# Patient Record
Sex: MALE | Race: WHITE | Employment: FULL TIME
[De-identification: names, ages, dates, MRNs, and addresses within clinical notes are randomized per-mention and may not be internally consistent; named-entity substitution may affect disease eponyms.]

---

## 2023-08-16 ENCOUNTER — HOSPITAL ENCOUNTER (OUTPATIENT)
Facility: HOSPITAL | Age: 53
Setting detail: RECURRING SERIES
Discharge: HOME | End: 2023-08-19
Payer: COMMERCIAL

## 2023-08-16 PROCEDURE — 97161 PT EVAL LOW COMPLEX 20 MIN: CPT

## 2023-08-16 NOTE — PROGRESS NOTES
[facility]  [address] Ifeanyi Ramon, [address] [facility]  Ph [phone]  Fx [phone]    Plan of Care/ Statement of Necessity for Physical Therapy Services      Patient name: Faye Low Start of Care: 2023   Referral source: Carly Jessica MD : 1970    Medical Diagnosis: Other low back pain [M54.59]   Onset Date:23    Treatment Diagnosis: M54.59  OTHER LOWER BACK PAIN     Prior Hospitalization: see medical history Provider#: [id number]   Medications: Verified on Patient summary List   Comorbidities: NA  Prior Level of Function: functionally independent, no AD, active lifestyle      The Plan of Care and following information is based on the information from the initial evaluation. Assessment/ key information: 46 yo male who [facility] to [facility] with c/o low back pain. Patient reports chronic low back pain with insidious onset that has gotten progressively worse as he continues to complete work duties and golf. Current symptoms/Complaints: 3-4/10 at best with sitting and resting; 8/10 at worst with laying on back, being on feet. Patient demonstrates decreased ROM, decreased strength, impaired posture, impaired gait mechancis, pain and decreased functional mobility tolerance. Patient will continue to benefit from skilled PT services to modify and progress therapeutic interventions, address functional mobility deficits, address ROM deficits, address strength deficits, analyze and address soft tissue restrictions, analyze and cue movement patterns, analyze and modify body mechanics/ergonomics, and assess and modify postural abnormalities to attain remaining goals.     Evaluation Complexity HistoryLOW Complexity : Zero comorbidities / personal factors that will impact the outcome / POC ; Examination MEDIUM Complexity : 3 Standardized tests and measures addressin body structure, function, activity limitation and / or participation in recreation  ;Presentation MEDIUM

## 2023-08-16 NOTE — PROGRESS NOTES
PT DAILY TREATMENT NOTE/ LUMBAR EVAL 10-18      Patient Name: Radha Jo    Date: 2023    : 1970  Insurance: Payor: Stormy Esparza / Plan: OPTIMA HMO / Product Type: *No Product type* /      Patient  verified yes     Visit #   Current / Total 1 24   Time   In / Out [id number]     TREATMENT AREA =  Other low back pain [M54.59]      SUBJECTIVE  Pain Level (0-10 scale): 4/10  [x]constant []intermittent []improving []worsening []no change since onset    Any medication changes, allergies to medications, adverse drug reactions, diagnosis change, or new procedure performed?: [x] No    [] Yes (see summary sheet for update)  Subjective functional status/changes:     PLOF: functionally independent, no AD, active lifestyle  Limitations to PLOF: pain, weakness, numbness/tingling  Mechanism of Injury: chronic low back pain with insidious onset that has gotten progressively worse as he continues to complete work duties and golf  Current symptoms/Complaints: 3-4/10 at best with sitting and resting; 8/10 at worst with laying on back, being on feet  Previous Treatment/Compliance: has started doing some stretches independently; taking ibuprofen PRN; uses heat  PMHx/Surgical Hx: NA  Work Hx: HVAC contractor  Living Situation: one story home, [address]; reports increased pain with stair navigation  Pt Goals: \"I'm really looking to get some relief so that I'm not in pain all the time\"  Barriers: [x]pain []financial []time []transportation []other  Motivation: good  Substance use: []Alcohol []Tobacco []other:   Cognition: A & O x 3        OBJECTIVE    25 min []Eval - untimed                                 [x]  Patient Education billed concurrently with other procedures   [x] Review HEP    [] Progressed/Changed HEP, detail:    [] Other detail:       General Evaluation    Physical Therapy Evaluation - Lumbar Spine    OBJECTIVE  Posture:  Lateral Shift: [] Right    [x] Left     [x] +  [] -  Kyphosis: [] Increased [] Decreased   [x]

## 2023-09-01 ENCOUNTER — HOSPITAL ENCOUNTER (OUTPATIENT)
Facility: HOSPITAL | Age: 53
Setting detail: RECURRING SERIES
Discharge: HOME | End: 2023-09-04
Payer: COMMERCIAL

## 2023-09-01 PROCEDURE — 97110 THERAPEUTIC EXERCISES: CPT

## 2023-09-01 PROCEDURE — 97112 NEUROMUSCULAR REEDUCATION: CPT

## 2023-09-01 NOTE — PROGRESS NOTES
Negar Dubon, PT [facility]   9/20/2023  7:10 AM Amanuel Kail, PT [facility]   9/22/2023  7:50 AM Jacqueline Rowland Garfield, PT [facility]   9/25/2023  7:50 AM Jacqueline Rowland Garfield, PT [facility]   9/27/2023  7:10 AM Agueda Adsit, PT [facility]   10/2/2023  7:10 AM Agueda Adsit, PT [facility]   10/4/2023  7:10 AM Agueda Adsit, PT [facility]   10/11/2023  7:10 AM Amanuel Kail, PT [facility]   10/13/2023  7:10 AM Amanuel Kail, PT [facility]   10/18/2023  7:10 AM Agueda Adsit, PT [facility]   10/20/2023  7:10 AM Amanuel Kail, PT [facility]   10/25/2023  7:10 AM Agueda Adsit, PT [facility]   10/27/2023  7:10 AM Agueda Adsit, PT [facility]   11/1/2023  7:10 AM Agueda Adsit, PT [facility]   11/3/2023  7:10 AM Agueda Adsit, PT [facility]   11/6/2023  7:10 AM Agueda Adsit, PT [facility]   11/8/2023  7:10 AM Amanuel Kail, PT [facility]   11/13/2023  7:10 AM Agueda Adsit, PT [facility]   11/15/2023  7:10 AM Agueda Adsit, PT [facility]

## 2023-09-06 ENCOUNTER — HOSPITAL ENCOUNTER (OUTPATIENT)
Facility: HOSPITAL | Age: 53
Setting detail: RECURRING SERIES
Discharge: HOME | End: 2023-09-09
Payer: COMMERCIAL

## 2023-09-06 PROCEDURE — 97112 NEUROMUSCULAR REEDUCATION: CPT

## 2023-09-06 PROCEDURE — 97110 THERAPEUTIC EXERCISES: CPT

## 2023-09-06 PROCEDURE — 97535 SELF CARE MNGMENT TRAINING: CPT

## 2023-09-06 PROCEDURE — 97530 THERAPEUTIC ACTIVITIES: CPT

## 2023-09-06 NOTE — PROGRESS NOTES
Status: FOTO 49  FOTO score = an established functional score where 100 = no disability     Patient will improve pain in low back to 5/10 at worst to improve standing and walking tolerance and restore prior level of function. Eval Status: 8/10 at worst     Pt will have painfree lumbar AROM WFL to aid in functional mechanics for ambulation/ADLs. Eval Status:   ROM % AROM Comments:pain, area   Forward flexion 40-60 WFL     Extension 20-30   P! SideBend right 20-30 58cm FFF P! SideBend left 20-30 54cm FFF P! Rotation right 5-10 [facility]     [facility]      9/1: PROGRESSING  ROM % loss Comments:pain, area   Forward flexion 40-60 WFL  stiff   Extension 20-30  90% loss P! SideBend right 20-30 25% loss stiff   SideBend left 20-30 WNL P! Rotation right 5-10 WFL     Rotation left 5-10 WFL      50% loss sideglide hips to left     Long Term Goals: To be accomplished in 24 treatments:  Patient will improve FOTO score by 13 points to improve functional tolerance for return to golf without increased pain. Eval Status: FOTO 49  FOTO score = an established functional score where 100 = no disability     2. Pt will have 5/5 bilateral LE and core strength to return to goals of golfing without increased pain. Eval Status:     Left(0-5) Right (0-5)   Hip Flexion (L1,2) 5 5   Knee Extension (L3,4) 5 5   Ankle Dorsiflexion (L4) 5 5   Hip Extension (L5) 4- 4-   Ankle Plantarflexion (S1) 5 5   Knee Flexion (S1,2) 5 5   Abdominals 4     Paraspinals 4     Back Rotators 4+ 4+   Gluteus Reg 4- 4-   Hip Abduction 4 4         3. Patient will improve pain in low back to 0/10 at worst to improve standing and walking tolerance and restore prior level of function.   Eval Status: 8/10 at worst      PLAN  Yes  Continue plan of care  []  Upgrade activities as tolerated  []  Discharge due to :  []  Other:    Deacon Loja, PT    9/6/2023    7:31 AM    Future Appointments   Date Time Provider [address] [place] [address]   9/7/2023  8:30 AM

## 2023-09-07 ENCOUNTER — APPOINTMENT (OUTPATIENT)
Facility: HOSPITAL | Age: 53
End: 2023-09-07
Payer: COMMERCIAL

## 2023-09-12 ENCOUNTER — HOSPITAL ENCOUNTER (OUTPATIENT)
Facility: HOSPITAL | Age: 53
Setting detail: RECURRING SERIES
Discharge: HOME | End: 2023-09-15
Payer: COMMERCIAL

## 2023-09-12 PROCEDURE — 97530 THERAPEUTIC ACTIVITIES: CPT

## 2023-09-12 PROCEDURE — 97535 SELF CARE MNGMENT TRAINING: CPT

## 2023-09-12 PROCEDURE — 97112 NEUROMUSCULAR REEDUCATION: CPT

## 2023-09-12 PROCEDURE — 97110 THERAPEUTIC EXERCISES: CPT

## 2023-09-12 NOTE — PROGRESS NOTES
AM Dickerson Spencer, PT [facility]   9/20/2023  7:10 AM Dickerson Spencer, PT [facility]   9/22/2023  7:50 AM Eliezer Clayton Garfield, PT [facility]   9/25/2023  7:50 AM Eliezer Murdockell, PT [facility]   9/27/2023  7:10 AM Agueda Adsit, PT [facility]   10/2/2023  7:10 AM Agueda Adsit, PT [facility]   10/4/2023  7:10 AM Agueda Adsit, PT [facility]   10/11/2023  7:10 AM Tuan Diezar, PT [facility]   10/13/2023  7:10 AM Tuan Diezar, PT [facility]   10/18/2023  7:10 AM Agueda Adsit, PT [facility]   10/20/2023  7:10 AM Dickerson Spencer, PT [facility]   10/25/2023  7:10 AM Agueda Adsit, PT [facility]   10/27/2023  7:10 AM Agueda Adsit, PT [facility]   11/1/2023  7:10 AM Agueda Adsit, PT [facility]   11/3/2023  7:10 AM Agueda Adsit, PT [facility]   11/6/2023  7:10 AM Agueda Adsit, PT [facility]   11/8/2023  7:10 AM Dickerson Spencer, PT [facility]   11/13/2023  7:10 AM Agueda Adsit, PT [facility]   11/15/2023  7:10 AM Aguead Adsit, PT [facility]

## 2023-09-15 ENCOUNTER — HOSPITAL ENCOUNTER (OUTPATIENT)
Facility: HOSPITAL | Age: 53
Setting detail: RECURRING SERIES
Discharge: HOME | End: 2023-09-18
Payer: COMMERCIAL

## 2023-09-15 PROCEDURE — 97110 THERAPEUTIC EXERCISES: CPT

## 2023-09-15 PROCEDURE — 97112 NEUROMUSCULAR REEDUCATION: CPT

## 2023-09-15 PROCEDURE — 97530 THERAPEUTIC ACTIVITIES: CPT

## 2023-09-15 NOTE — PROGRESS NOTES
PHYSICAL / OCCUPATIONAL THERAPY - DAILY TREATMENT NOTE (updated )    Patient Name: Brenda Loomis    Date: 9/15/2023    : 1970  Insurance: Payor: Laroy Scheuermann / Plan: OPTIMA HMO / Product Type: *No Product type* /      Patient  verified Yes     Visit #   Current / Total 5 24   Time   In / Out 7:07 751   Pain   In / Out 3 0   Subjective Functional Status/Changes: \"I feel like I make improvement, then I go tot work and it tweaks it. But the stretches are helping, I think I am going in the right direction. \" The right side is still tight. Changes to: Allergies, Med Hx, Sx Hx?   no       TREATMENT AREA =  Other low back pain [M54.59]    OBJECTIVE        Therapeutic Procedures: Tx Min Billable or 1:1 Min (if diff from Tx Min) Procedure, Rationale, Specifics   15 15 95349 Therapeutic Exercise (timed):  increase ROM, strength, coordination, balance, and proprioception to improve patient's ability to progress to PLOF and address remaining functional goals. (see flow sheet as applicable)    Details if applicable:       15 15 90021 Neuromuscular Re-Education (timed):  improve balance, coordination, kinesthetic sense, posture, core stability and proprioception to improve patient's ability to develop conscious control of individual muscles and awareness of position of extremities in order to progress to PLOF and address remaining functional goals. (see flow sheet as applicable)    Details if applicable:      68584 Therapeutic Activity (timed):  use of dynamic activities replicating functional movements to increase ROM, strength, coordination, balance, and proprioception in order to improve patient's ability to progress to PLOF and address remaining functional goals.   (see flow sheet as applicable)     Details if applicable:     44 40 [facility] Totals Reminder: bill using total billable min of TIMED therapeutic procedures (example: do not include dry needle or estim unattended, both untimed codes, in totals to

## 2023-09-15 NOTE — PROGRESS NOTES
[facility]  [address], [address] [facility]  Ph [phone]  Fx [phone]    Physical Therapy Progress Note  Patient name: Igor Fuller Start of Care: 2023   Referral source: Chasidy Lorenzana MD : 1970               Medical Diagnosis: Other low back pain [M54.59]    Onset Date:23               Treatment Diagnosis: M54.59  OTHER LOWER BACK PAIN     Prior Hospitalization: see medical history Provider#: [id number]   Medications: Verified on Patient summary List   Comorbidities: NA  Prior Level of Function: functionally independent, no AD, active lifestyle                                Visits from Start of Care: 5    Missed Visits: 0    Updated Goals/Measure of Progress: To be achieved in 19 treatments:    Short Term Goals: To be accomplished in 6 treatments:  GOAL MET Patient will be independent and compliant with HEP to progress toward goals and restore functional mobility. Eval Status: issued at [facility]  Current: reports he is completing HEP daily since IE 23  PN 9/15: Compliant with HEP. GOAL MET     Patient will improve FOTO score by 6 points to improve functional tolerance for exercise. Eval Status: FOTO 49  PN 9/15: 50 PROGRESSING  FOTO score = an established functional score where 100 = no disability     Patient will improve pain in low back to 5/10 at worst to improve standing and walking tolerance and restore prior level of function. Eval Status: 8/10 at worst  PN 9/15: 7/10 after golf tournament. But pt is able to use exercises to  help manage pain. PROGRESSING     Pt will have painfree lumbar AROM WFL to aid in functional mechanics for ambulation/ADLs. Eval Status:   ROM % AROM Comments:pain, area   Forward flexion 40-60 WFL     Extension 20-30   P! SideBend right 20-30 58cm FFF P! SideBend left 20-30 54cm FFF P!    Rotation right 5-10 WFL     Rotation left 5-10 [facility]      : PROGRESSING  ROM % loss Comments:pain, area   Forward flexion

## 2023-09-20 ENCOUNTER — HOSPITAL ENCOUNTER (OUTPATIENT)
Facility: HOSPITAL | Age: 53
Setting detail: RECURRING SERIES
Discharge: HOME | End: 2023-09-23
Payer: COMMERCIAL

## 2023-09-20 PROCEDURE — 97530 THERAPEUTIC ACTIVITIES: CPT

## 2023-09-20 PROCEDURE — 97110 THERAPEUTIC EXERCISES: CPT

## 2023-09-20 PROCEDURE — 97112 NEUROMUSCULAR REEDUCATION: CPT

## 2023-09-20 PROCEDURE — 97535 SELF CARE MNGMENT TRAINING: CPT

## 2023-09-20 NOTE — PROGRESS NOTES
from continued skilled PT intervention to address remaining deficits outlined in goals below. Patient will continue to benefit from skilled PT / OT services to modify and progress therapeutic interventions, analyze and address functional mobility deficits, analyze and address ROM deficits, analyze and address strength deficits, analyze and address soft tissue restrictions, analyze and cue for proper movement patterns, and analyze and modify for postural abnormalities to address functional deficits and attain remaining goals. Progress toward goals / Updated goals:  []  See Progress Note/Recertification    GOAL MET Patient will be independent and compliant with HEP to progress toward goals and restore functional mobility. Eval Status: [facility]  Current: reports he is completing HEP daily since IE 9/6/23  PN 9/15: Compliant with HEP. GOAL MET     Patient will improve FOTO score by 6 points to improve functional tolerance for exercise. Eval Status: FOTO 49  PN 9/15: 50 PROGRESSING  FOTO score = an established functional score where 100 = no disability     Patient will improve pain in low back to 5/10 at worst to improve standing and walking tolerance and restore prior level of function. Eval Status: 8/10 at worst  PN 9/15: 7/10 after golf tournament. But pt is able to use exercises to  help manage pain. PROGRESSING     Pt will have painfree lumbar AROM WFL to aid in functional mechanics for ambulation/ADLs. Eval Status:   ROM % AROM Comments:pain, area   Forward flexion 40-60 WFL     Extension 20-30   P! SideBend right 20-30 58cm FFF P! SideBend left 20-30 54cm FFF P!    Rotation right 5-10 [facility]     Rotation left 5-10 [facility]      PN 9/15: PROGRESSING  ROM % loss Comments:pain, area   Forward flexion 40-60 WFL  stiff   Extension 20-30  80% loss stiff   SideBend right 20-30 10% loss stiff   SideBend left 20-30 WNL     Rotation right 5-10 WFL     Rotation left 5-10 WFL     25% loss sideglide hips to

## 2023-09-22 ENCOUNTER — HOSPITAL ENCOUNTER (OUTPATIENT)
Facility: HOSPITAL | Age: 53
Setting detail: RECURRING SERIES
Discharge: HOME | End: 2023-09-25
Payer: COMMERCIAL

## 2023-09-22 PROCEDURE — 97530 THERAPEUTIC ACTIVITIES: CPT

## 2023-09-22 PROCEDURE — 97110 THERAPEUTIC EXERCISES: CPT

## 2023-09-22 PROCEDURE — 97112 NEUROMUSCULAR REEDUCATION: CPT

## 2023-09-22 NOTE — PROGRESS NOTES
PHYSICAL / OCCUPATIONAL THERAPY - DAILY TREATMENT NOTE (updated )    Patient Name: Magali Isaacs    Date: 2023    : 1970  Insurance: Payor: Vitaliy Baker / Plan: OPTIMA HMO / Product Type: *No Product type* /      Patient  verified Yes     Visit #   Current / Total 7 24   Time   In / Out 7:10 750   Pain   In / Out 2-3 0   Subjective Functional Status/Changes: Felt really good after last visit, felt like we really hit the spot. Changes to: Allergies, Med Hx, Sx Hx?   no       TREATMENT AREA =  Other low back pain [M54.59]    OBJECTIVE    Therapeutic Procedures: Tx Min Billable or 1:1 Min (if diff from Tx Min) Procedure, Rationale, Specifics   20 20 31739 Therapeutic Exercise (timed):  increase ROM, strength, coordination, balance, and proprioception to improve patient's ability to progress to PLOF and address remaining functional goals. (see flow sheet as applicable)    Details if applicable:    Return to extension progression:   x10 DKTC - x10 prone press ups - x10 DKTC - improved ROM  x10 DKTC - x10 prone press ups with PA overpressure on left PSIS, AP overpressure on right ASIS - x10 DKTC - improved ROM     10 10 24502 Neuromuscular Re-Education (timed):  improve balance, coordination, kinesthetic sense, posture, core stability and proprioception to improve patient's ability to develop conscious control of individual muscles and awareness of position of extremities in order to progress to PLOF and address remaining functional goals. (see flow sheet as applicable)    Details if applicable:     10 10 02036 Therapeutic Activity (timed):  use of dynamic activities replicating functional movements to increase ROM, strength, coordination, balance, and proprioception in order to improve patient's ability to progress to PLOF and address remaining functional goals.   (see flow sheet as applicable)     Details if applicable:     40 36 [address] Reminder: bill using total billable min of TIMED

## 2023-09-25 ENCOUNTER — HOSPITAL ENCOUNTER (OUTPATIENT)
Facility: HOSPITAL | Age: 53
Setting detail: RECURRING SERIES
Discharge: HOME | End: 2023-09-28
Payer: COMMERCIAL

## 2023-09-25 PROCEDURE — 97530 THERAPEUTIC ACTIVITIES: CPT

## 2023-09-25 PROCEDURE — 97112 NEUROMUSCULAR REEDUCATION: CPT

## 2023-09-25 NOTE — PROGRESS NOTES
PROGRESSING  Current: 5-6/10 at worst this week.  PROGRESSING 9/22         PLAN  Yes  Continue plan of care  []  Upgrade activities as tolerated  []  Discharge due to :  []  Other:    Laurence Licona PT    9/25/2023    8:00 AM    Future Appointments   Date Time Provider [address]   9/27/2023  7:10  [address], PT [facility]   10/2/2023  7:10 AM Agueda Adsit, PT [facility]   10/4/2023  7:10 AM Mena Merlin, PT [facility]   10/11/2023  7:10 AM Mena Merlin, PT [facility]   10/13/2023  7:10 AM Mena Merlin, PT [facility]   10/18/2023  7:10 AM Agueda Adsit, PT [facility]   10/20/2023  7:10 AM Mena Merlin, PT [facility]   10/25/2023  7:10 AM Agueda Adsit, PT [facility]   10/27/2023  7:10 AM Agueda Adsit, PT [facility]   11/1/2023  7:10 AM Agueda Adsit, PT [facility]   11/3/2023  7:10 AM Agueda Adsit, PT [facility]   11/6/2023  7:10 AM Agueda Adsit, PT [facility]   11/8/2023  7:10 AM Mena Merlin, PT [facility]   11/13/2023  7:10 AM Agueda Adsit, PT [facility]   11/15/2023  7:10 AM Agueda Adsit, PT [facility]

## 2023-09-27 ENCOUNTER — HOSPITAL ENCOUNTER (OUTPATIENT)
Facility: HOSPITAL | Age: 53
Setting detail: RECURRING SERIES
Discharge: HOME | End: 2023-09-30
Payer: COMMERCIAL

## 2023-09-27 PROCEDURE — 97110 THERAPEUTIC EXERCISES: CPT

## 2023-09-27 PROCEDURE — 97112 NEUROMUSCULAR REEDUCATION: CPT

## 2023-09-27 PROCEDURE — 97530 THERAPEUTIC ACTIVITIES: CPT

## 2023-09-27 NOTE — PROGRESS NOTES
Ankle Dorsiflexion (L4) 5 5   Hip Extension (L5) 4 4   Ankle Plantarflexion (S1) 5 5   Knee Flexion (S1,2) 5 5   Abdominals 4     Paraspinals 4     Back Rotators 4+ 4+   Gluteus Reg 4 4   Hip Abduction 4+ 4+         3. Patient will improve pain in low back to 0/10 at worst to improve standing and walking tolerance and restore prior level of function. Eval Status: 8/10 at worst  PN 9/15: 7/10 after golf tournament. But pt is able to use exercises to  help manage pain. PROGRESSING  Current: 5-6/10 at worst this week.  PROGRESSING 9/22         PLAN  Yes  Continue plan of care  []  Upgrade activities as tolerated  []  Discharge due to :  []  Other:    Karen Chakraborty PT    9/27/2023    7:21 AM    Future Appointments   Date Time Provider [address]   10/2/2023  7:10  [address] [place] [address], [facility]   10/4/2023  7:10 AM Karen Chakraborty PT [facility]   10/11/2023  7:10 AM Karen Chakraborty PT [facility]   10/13/2023  7:10 AM Karen Chakraborty PT [facility]   10/18/2023  7:10 AM Aguedapriyank Soria, PT [facility]   10/20/2023  7:10 AM Karen Chakraborty PT [facility]   10/25/2023  7:10 AM Agueda Adsit, PT [facility]   10/27/2023  7:10 AM Agueda Adsit, PT [facility]   11/1/2023  7:10 AM Agueda Adsit, PT [facility]   11/3/2023  7:10 AM Agueda Adsit, PT [facility]   11/6/2023  7:10 AM Agueda Adsit, PT [facility]   11/8/2023  7:10 AM Karen Chakraborty PT [facility]   11/13/2023  7:10 AM [facility]   11/15/2023  7:10 AM Agueda Soria PT [facility]

## 2023-10-02 ENCOUNTER — HOSPITAL ENCOUNTER (OUTPATIENT)
Facility: HOSPITAL | Age: 53
Setting detail: RECURRING SERIES
Discharge: HOME | End: 2023-10-05
Payer: COMMERCIAL

## 2023-10-02 PROCEDURE — 97535 SELF CARE MNGMENT TRAINING: CPT

## 2023-10-02 PROCEDURE — 97110 THERAPEUTIC EXERCISES: CPT

## 2023-10-02 PROCEDURE — 97530 THERAPEUTIC ACTIVITIES: CPT

## 2023-10-02 PROCEDURE — 97112 NEUROMUSCULAR REEDUCATION: CPT

## 2023-10-02 NOTE — PROGRESS NOTES
Details if applicable:     45  [facility] Totals Reminder: bill using total billable min of TIMED therapeutic procedures (example: do not include dry needle or estim unattended, both untimed codes, in totals to left)  8-22 min = 1 unit; 23-37 min = 2 units; 38-52 min = 3 units; 53-67 min = 4 units; 68-82 min = 5 units   Total Total     TOTAL TREATMENT TIME:        38     [x]  Patient Education billed concurrently with other procedures   [x] Review HEP    [] Progressed/Changed HEP, detail:    [] Other detail:       Objective Information/Functional Measures/Assessment    Patient has attended 10 PT visits thus far with good progress toward goals. He demonstrates improved strength, ROM And pain levels since [facility] however is still having \"twinges and flare ups\" but not as intense or as frequent. He tolerated treatment session well today. Patient had no complaints with addition of increased resistance to exercise program to accomplish improved core strength and stability. Patient continues to make good progress toward goals and would benefit from continued skilled PT intervention to address remaining deficits outlined in goals below. Patient will continue to benefit from skilled PT / OT services to modify and progress therapeutic interventions, analyze and address functional mobility deficits, analyze and address ROM deficits, analyze and address strength deficits, analyze and address soft tissue restrictions, analyze and cue for proper movement patterns, and analyze and modify for postural abnormalities to address functional deficits and attain remaining goals. Progress toward goals / Updated goals:  [x]  See Progress Note/Recertification    GOAL MET Patient will be independent and compliant with HEP to progress toward goals and restore functional mobility. Eval Status: issued at eval  PN 9/15: Compliant with HEP.  GOAL MET     Patient will improve FOTO score by 6 points to improve functional tolerance for

## 2023-10-02 NOTE — PROGRESS NOTES
20-30  80% loss stiff   SideBend right 20-30 10% loss stiff   SideBend left 20-30 WNL     Rotation right 5-10 [facility] [place]     Rotation left 5-10 WFL     25% loss sideglide hips to left  10/2/22 PN:  ROM % loss Comments:pain, area   Forward flexion 40-60 WNL     Extension 20-30  60% loss stiff   SideBend right 20-30 WNL stiff   SideBend left 20-30 WNL     Rotation right 5-10 WFL     Rotation left 5-10 WFL     10% loss sideglide hips to left             Long Term Goals: To be accomplished in 24 treatments:  Patient will improve FOTO score by 13 points to improve functional tolerance for return to golf without increased pain. Eval Status: FOTO 49   PN 9/15: 50. PROGRESSING  FOTO score = an established functional score where 100 = no disability   10/2/23 PN: 50     2. Pt will have 5/5 bilateral LE and core strength to return to goals of golfing without increased pain. Eval Status:     Left(0-5) Right (0-5)   Hip Flexion (L1,2) 5 5   Knee Extension (L3,4) 5 5   Ankle Dorsiflexion (L4) 5 5   Hip Extension (L5) 4- 4-   Ankle Plantarflexion (S1) 5 5   Knee Flexion (S1,2) 5 5   Abdominals 4     Paraspinals 4     Back Rotators 4+ 4+   Gluteus Reg 4- 4-   Hip Abduction 4 4     PN 9/15: PROGRESSING    Left(0-5) Right (0-5)   Hip Flexion (L1,2) 5 5   Knee Extension (L3,4) 5 5   Ankle Dorsiflexion (L4) 5 5   Hip Extension (L5) 4 4   Ankle Plantarflexion (S1) 5 5   Knee Flexion (S1,2) 5 5   Abdominals 4     Paraspinals 4     Back Rotators 4+ 4+   Gluteus Reg 4 4   Hip Abduction 4+ 4+    10/2/23 PN:     Left(0-5) Right (0-5)   Hip Flexion (L1,2) 5 5   Knee Extension (L3,4) 5 5   Ankle Dorsiflexion (L4) 5 5   Hip Extension (L5) 4+ 4+   Ankle Plantarflexion (S1) 5 5   Knee Flexion (S1,2) 5 5   Abdominals 5     Paraspinals 4     Back Rotators 4+ 4+   Gluteus Reg 4+ 4+   Hip Abduction 4+ 4+         3.    Patient will improve pain in low back to 0/10 at worst to improve standing and walking tolerance and restore prior level of

## 2023-10-04 ENCOUNTER — APPOINTMENT (OUTPATIENT)
Facility: HOSPITAL | Age: 53
End: 2023-10-04
Payer: COMMERCIAL

## 2023-10-11 ENCOUNTER — HOSPITAL ENCOUNTER (OUTPATIENT)
Facility: HOSPITAL | Age: 53
Setting detail: RECURRING SERIES
Discharge: HOME | End: 2023-10-14
Payer: COMMERCIAL

## 2023-10-11 PROCEDURE — 97112 NEUROMUSCULAR REEDUCATION: CPT

## 2023-10-11 PROCEDURE — 97530 THERAPEUTIC ACTIVITIES: CPT

## 2023-10-11 PROCEDURE — 97110 THERAPEUTIC EXERCISES: CPT

## 2023-10-11 NOTE — PROGRESS NOTES
PHYSICAL / OCCUPATIONAL THERAPY - DAILY TREATMENT NOTE (updated )    Patient Name: Og Parmar    Date: 10/11/2023    : 1970  Insurance: Payor: Kisha Cruz / Plan: [employer]MO / Product Type: *No Product type* /      Patient  verified Yes     Visit #   Current / Total 11 24   Time   In / Out 7:15 752   Pain   In / Out 2 2   Subjective Functional Status/Changes: Tired, was up late last night. Changes to: Allergies, Med Hx, Sx Hx?   no       TREATMENT AREA =  Other low back pain [M54.59]    OBJECTIVE      Therapeutic Procedures: Tx Min Billable or 1:1 Min (if diff from Tx Min) Procedure, Rationale, Specifics   15 15 [id number] Therapeutic Exercise (timed):  increase ROM, strength, coordination, balance, and proprioception to improve patient's ability to progress to PLOF and address remaining functional goals. (see flow sheet as applicable)    Details if applicable:       13 13 [id number] Neuromuscular Re-Education (timed):  improve balance, coordination, kinesthetic sense, posture, core stability and proprioception to improve patient's ability to develop conscious control of individual muscles and awareness of position of extremities in order to progress to PLOF and address remaining functional goals. (see flow sheet as applicable)    Details if applicable:     9 9 66085 Therapeutic Activity (timed):  use of dynamic activities replicating functional movements to increase ROM, strength, coordination, balance, and proprioception in order to improve patient's ability to progress to PLOF and address remaining functional goals.   (see flow sheet as applicable)     Details if applicable:     40 37 [facility] Totals Reminder: bill using total billable min of TIMED therapeutic procedures (example: do not include dry needle or estim unattended, both untimed codes, in totals to left)  8-22 min = 1 unit; 23-37 min = 2 units; 38-52 min = 3 units; 53-67 min = 4 units; 68-82 min = 5 units   Total Total     TOTAL TREATMENT TIME:

## 2023-10-13 ENCOUNTER — TELEPHONE (OUTPATIENT)
Facility: HOSPITAL | Age: 53
End: 2023-10-13

## 2023-10-13 NOTE — TELEPHONE ENCOUNTER
No show - pt called at 7:13 for 7:10 appt stating he was sick and unable to attend PT.  Therapist called and left message at 7:30 to remind of next scheduled visit (did not realize pt had left a voicemail)

## 2023-10-18 ENCOUNTER — HOSPITAL ENCOUNTER (OUTPATIENT)
Facility: HOSPITAL | Age: 53
Setting detail: RECURRING SERIES
Discharge: HOME | End: 2023-10-21
Payer: COMMERCIAL

## 2023-10-18 PROCEDURE — 97530 THERAPEUTIC ACTIVITIES: CPT

## 2023-10-18 PROCEDURE — 97110 THERAPEUTIC EXERCISES: CPT

## 2023-10-18 PROCEDURE — 97112 NEUROMUSCULAR REEDUCATION: CPT

## 2023-10-20 ENCOUNTER — HOSPITAL ENCOUNTER (OUTPATIENT)
Facility: HOSPITAL | Age: 53
Setting detail: RECURRING SERIES
Discharge: HOME | End: 2023-10-23
Payer: COMMERCIAL

## 2023-10-20 PROCEDURE — 97112 NEUROMUSCULAR REEDUCATION: CPT

## 2023-10-20 PROCEDURE — 97110 THERAPEUTIC EXERCISES: CPT

## 2023-10-20 PROCEDURE — 97530 THERAPEUTIC ACTIVITIES: CPT

## 2023-10-20 NOTE — PROGRESS NOTES
PHYSICAL / OCCUPATIONAL THERAPY - DAILY TREATMENT NOTE (updated )    Patient Name: Minnie Elvira    Date: 10/20/2023    : 1970  Insurance: Payor: Yaya Sanchez / Plan: [employer]MO / Product Type: *No Product type* /      Patient  verified Yes     Visit #   Current / Total 13 24   Time   In / Out 7:08 749   Pain   In / Out 2 0   Subjective Functional Status/Changes: No complaints, gets to see his daughter this weekend. Changes to: Allergies, Med Hx, Sx Hx?   no       TREATMENT AREA =  Other low back pain [M54.59]    OBJECTIVE    Therapeutic Procedures: Tx Min Billable or 1:1 Min (if diff from Tx Min) Procedure, Rationale, Specifics   15 15 [id number] Therapeutic Exercise (timed):  increase ROM, strength, coordination, balance, and proprioception to improve patient's ability to progress to PLOF and address remaining functional goals. (see flow sheet as applicable)    Details if applicable:       15 15 17050 Neuromuscular Re-Education (timed):  improve balance, coordination, kinesthetic sense, posture, core stability and proprioception to improve patient's ability to develop conscious control of individual muscles and awareness of position of extremities in order to progress to PLOF and address remaining functional goals. (see flow sheet as applicable)    Details if applicable:      27517 Therapeutic Activity (timed):  use of dynamic activities replicating functional movements to increase ROM, strength, coordination, balance, and proprioception in order to improve patient's ability to progress to PLOF and address remaining functional goals.   (see flow sheet as applicable)     Details if applicable:           Details if applicable:            Details if applicable:     39 41 [facility] Totals Reminder: bill using total billable min of TIMED therapeutic procedures (example: do not include dry needle or estim unattended, both untimed codes, in totals to left)  8-22 min = 1 unit; 23-37 min = 2 units; 38-52 min = 3

## 2023-10-25 ENCOUNTER — HOSPITAL ENCOUNTER (OUTPATIENT)
Facility: HOSPITAL | Age: 53
Setting detail: RECURRING SERIES
Discharge: HOME | End: 2023-10-28
Payer: COMMERCIAL

## 2023-10-25 PROCEDURE — 97112 NEUROMUSCULAR REEDUCATION: CPT

## 2023-10-25 PROCEDURE — 97530 THERAPEUTIC ACTIVITIES: CPT

## 2023-10-25 PROCEDURE — 97110 THERAPEUTIC EXERCISES: CPT

## 2023-10-25 NOTE — PROGRESS NOTES
PHYSICAL / OCCUPATIONAL THERAPY - DAILY TREATMENT NOTE (updated )    Patient Name: Magali Isaacs    Date: 10/25/2023    : 1970  Insurance: Payor: Vitaliy Grammes / Plan: [employer] HMO / Product Type: *No Product type* /      Patient  verified Yes     Visit #   Current / Total 14 24   Time   In / Out 0715 0750   Pain   In / Out 2 0   Subjective Functional Status/Changes: Pt pleasant, reports no new changes today. Changes to: Allergies, Med Hx, Sx Hx?   no       TREATMENT AREA =  Other low back pain [M54.59]    OBJECTIVE    Therapeutic Procedures: Tx Min Billable or 1:1 Min (if diff from Tx Min) Procedure, Rationale, Specifics   10  78930 Therapeutic Exercise (timed):  increase ROM, strength, coordination, balance, and proprioception to improve patient's ability to progress to PLOF and address remaining functional goals. (see flow sheet as applicable)    Details if applicable:       10  26336 Neuromuscular Re-Education (timed):  improve balance, coordination, kinesthetic sense, posture, core stability and proprioception to improve patient's ability to develop conscious control of individual muscles and awareness of position of extremities in order to progress to PLOF and address remaining functional goals. (see flow sheet as applicable)    Details if applicable:     15  26381 Therapeutic Activity (timed):  use of dynamic activities replicating functional movements to increase ROM, strength, coordination, balance, and proprioception in order to improve patient's ability to progress to PLOF and address remaining functional goals.   (see flow sheet as applicable)     Details if applicable:           Details if applicable:            Details if applicable:     28  [facility] Totals Reminder: bill using total billable min of TIMED therapeutic procedures (example: do not include dry needle or estim unattended, both untimed codes, in totals to left)  8-22 min = 1 unit; 23-37 min = 2 units; 38-52 min = 3 units; 53-67 min =

## 2023-10-27 ENCOUNTER — APPOINTMENT (OUTPATIENT)
Facility: HOSPITAL | Age: 53
End: 2023-10-27
Payer: COMMERCIAL

## 2023-11-01 ENCOUNTER — HOSPITAL ENCOUNTER (OUTPATIENT)
Facility: HOSPITAL | Age: 53
Setting detail: RECURRING SERIES
Discharge: HOME | End: 2023-11-04
Payer: COMMERCIAL

## 2023-11-01 PROCEDURE — 97530 THERAPEUTIC ACTIVITIES: CPT

## 2023-11-01 PROCEDURE — 97535 SELF CARE MNGMENT TRAINING: CPT

## 2023-11-01 NOTE — PROGRESS NOTES
PHYSICAL / OCCUPATIONAL THERAPY - DAILY TREATMENT NOTE (updated )    Patient Name: Estephanie Eric    Date: 2023    : 1970  Insurance: Payor: Cathryn James / Plan: [employer] HMO / Product Type: *No Product type* /      Patient  verified Yes     Visit #   Current / Total 15 15   Time   In / Out 0710 0730   Pain   In / Out 2 1   Subjective Functional Status/Changes: Pt pleasant, reports he thinks he is ready to DC from therapy at this time as he has had good education on    Changes to: Allergies, Med Hx, Sx Hx? yes       TREATMENT AREA =  Other low back pain [M54.59]    OBJECTIVE    Therapeutic Procedures: Tx Min Billable or 1:1 Min (if diff from Tx Min) Procedure, Rationale, Specifics   15  75632 Self Care/Home Management (timed):  improve patient knowledge and understanding of diagnosis/prognosis and physical therapy expectations, procedures and progression  to improve patient's ability to progress to PLOF and address remaining functional goals. (see flow sheet as applicable)    Details if applicable:       15  43453 Therapeutic Activity (timed):  use of dynamic activities replicating functional movements to increase ROM, strength, coordination, balance, and proprioception in order to improve patient's ability to progress to PLOF and address remaining functional goals.   (see flow sheet as applicable)    Details if applicable:            Details if applicable:           Details if applicable:            Details if applicable:     27  [facility] Totals Reminder: bill using total billable min of TIMED therapeutic procedures (example: do not include dry needle or estim unattended, both untimed codes, in totals to left)  8-22 min = 1 unit; 23-37 min = 2 units; 38-52 min = 3 units; 53-67 min = 4 units; 68-82 min = 5 units   Total Total     TOTAL TREATMENT TIME:        30     [x]  Patient Education billed concurrently with other procedures   [x] Review HEP    [] Progressed/Changed HEP, detail:    [] Other detail:
for DC to [facility] at this time and does demonstrate independence with HEP and good understanding of progression of exercise routine for strengthening. Patient demonstrates improved strength, ROM, pain levels and functional mobility since [facility] and is ready for DC.     RECOMMENDATIONS:  [x]Discontinue therapy: [x]Patient has reached or is progressing toward set goals      []Patient is non-compliant or has abdicated      []Due to lack of appreciable progress towards set goals    Agueda Soria, PT 11/1/2023 9:50 AM

## 2023-11-03 ENCOUNTER — APPOINTMENT (OUTPATIENT)
Facility: HOSPITAL | Age: 53
End: 2023-11-03
Payer: COMMERCIAL

## 2023-11-06 ENCOUNTER — APPOINTMENT (OUTPATIENT)
Facility: HOSPITAL | Age: 53
End: 2023-11-06
Payer: COMMERCIAL

## 2023-11-08 ENCOUNTER — APPOINTMENT (OUTPATIENT)
Facility: HOSPITAL | Age: 53
End: 2023-11-08
Payer: COMMERCIAL

## 2023-11-13 ENCOUNTER — APPOINTMENT (OUTPATIENT)
Facility: HOSPITAL | Age: 53
End: 2023-11-13
Payer: COMMERCIAL

## 2023-11-15 ENCOUNTER — APPOINTMENT (OUTPATIENT)
Facility: HOSPITAL | Age: 53
End: 2023-11-15
Payer: COMMERCIAL